# Patient Record
Sex: FEMALE | Race: BLACK OR AFRICAN AMERICAN | NOT HISPANIC OR LATINO | URBAN - METROPOLITAN AREA
[De-identification: names, ages, dates, MRNs, and addresses within clinical notes are randomized per-mention and may not be internally consistent; named-entity substitution may affect disease eponyms.]

---

## 2018-02-02 ENCOUNTER — EMERGENCY (EMERGENCY)
Facility: HOSPITAL | Age: 23
LOS: 1 days | Discharge: HOME | End: 2018-02-02
Admitting: EMERGENCY MEDICINE
Payer: MEDICAID

## 2018-02-02 VITALS
TEMPERATURE: 98 F | OXYGEN SATURATION: 100 % | RESPIRATION RATE: 16 BRPM | HEART RATE: 107 BPM | DIASTOLIC BLOOD PRESSURE: 90 MMHG | SYSTOLIC BLOOD PRESSURE: 156 MMHG

## 2018-02-02 DIAGNOSIS — F41.9 ANXIETY DISORDER, UNSPECIFIED: ICD-10-CM

## 2018-02-02 DIAGNOSIS — R00.2 PALPITATIONS: ICD-10-CM

## 2018-02-02 PROCEDURE — 99283 EMERGENCY DEPT VISIT LOW MDM: CPT

## 2018-02-02 NOTE — ED PROVIDER NOTE - OBJECTIVE STATEMENT
21 y/o F with PMH of Anxiety presents to ED c/o anxiety attack this afternoon that began while on her way to work. She began to feel anxious, restless, paresthesias throughout her body and racing palpitations. Her sx's have since resolved and she is now feeling better but was unable to make it to work today. States she would need an excuse note for work.    Denies fever, chills, headache, dizziness, LOC, CP, SOB, abdo pain, N/V/D, fall or injury

## 2018-02-02 NOTE — ED PROVIDER NOTE - MEDICAL DECISION MAKING DETAILS
Pt A&Ox3. NAD. Sitting comfortably. Asymptomatic at this time. Will D/C with instructions to F/U with Dr. Oviedo this week.

## 2022-09-22 ENCOUNTER — EMERGENCY (EMERGENCY)
Facility: HOSPITAL | Age: 27
LOS: 1 days | Discharge: HOME | End: 2022-09-22
Attending: EMERGENCY MEDICINE | Admitting: EMERGENCY MEDICINE

## 2022-09-22 VITALS
HEART RATE: 85 BPM | DIASTOLIC BLOOD PRESSURE: 60 MMHG | SYSTOLIC BLOOD PRESSURE: 140 MMHG | OXYGEN SATURATION: 98 % | RESPIRATION RATE: 18 BRPM | TEMPERATURE: 98 F

## 2022-09-22 LAB
ALBUMIN SERPL ELPH-MCNC: 4.5 G/DL — SIGNIFICANT CHANGE UP (ref 3.3–5)
ALP SERPL-CCNC: 78 U/L — SIGNIFICANT CHANGE UP (ref 40–120)
ALT FLD-CCNC: 29 U/L — SIGNIFICANT CHANGE UP (ref 4–33)
ANION GAP SERPL CALC-SCNC: 13 MMOL/L — SIGNIFICANT CHANGE UP (ref 7–14)
AST SERPL-CCNC: 24 U/L — SIGNIFICANT CHANGE UP (ref 4–32)
BASOPHILS # BLD AUTO: 0.06 K/UL — SIGNIFICANT CHANGE UP (ref 0–0.2)
BASOPHILS NFR BLD AUTO: 0.8 % — SIGNIFICANT CHANGE UP (ref 0–2)
BILIRUB SERPL-MCNC: <0.2 MG/DL — SIGNIFICANT CHANGE UP (ref 0.2–1.2)
BUN SERPL-MCNC: 10 MG/DL — SIGNIFICANT CHANGE UP (ref 7–23)
CALCIUM SERPL-MCNC: 9.3 MG/DL — SIGNIFICANT CHANGE UP (ref 8.4–10.5)
CHLORIDE SERPL-SCNC: 102 MMOL/L — SIGNIFICANT CHANGE UP (ref 98–107)
CO2 SERPL-SCNC: 24 MMOL/L — SIGNIFICANT CHANGE UP (ref 22–31)
CREAT SERPL-MCNC: 0.84 MG/DL — SIGNIFICANT CHANGE UP (ref 0.5–1.3)
EGFR: 98 ML/MIN/1.73M2 — SIGNIFICANT CHANGE UP
EOSINOPHIL # BLD AUTO: 0.62 K/UL — HIGH (ref 0–0.5)
EOSINOPHIL NFR BLD AUTO: 8 % — HIGH (ref 0–6)
FLUAV AG NPH QL: SIGNIFICANT CHANGE UP
FLUBV AG NPH QL: SIGNIFICANT CHANGE UP
GLUCOSE SERPL-MCNC: 106 MG/DL — HIGH (ref 70–99)
HCG SERPL-ACNC: <5 MIU/ML — SIGNIFICANT CHANGE UP
HCT VFR BLD CALC: 41.1 % — SIGNIFICANT CHANGE UP (ref 34.5–45)
HGB BLD-MCNC: 13.2 G/DL — SIGNIFICANT CHANGE UP (ref 11.5–15.5)
IANC: 3.66 K/UL — SIGNIFICANT CHANGE UP (ref 1.8–7.4)
IMM GRANULOCYTES NFR BLD AUTO: 0.4 % — SIGNIFICANT CHANGE UP (ref 0–0.9)
LYMPHOCYTES # BLD AUTO: 2.69 K/UL — SIGNIFICANT CHANGE UP (ref 1–3.3)
LYMPHOCYTES # BLD AUTO: 34.8 % — SIGNIFICANT CHANGE UP (ref 13–44)
MCHC RBC-ENTMCNC: 30 PG — SIGNIFICANT CHANGE UP (ref 27–34)
MCHC RBC-ENTMCNC: 32.1 GM/DL — SIGNIFICANT CHANGE UP (ref 32–36)
MCV RBC AUTO: 93.4 FL — SIGNIFICANT CHANGE UP (ref 80–100)
MONOCYTES # BLD AUTO: 0.67 K/UL — SIGNIFICANT CHANGE UP (ref 0–0.9)
MONOCYTES NFR BLD AUTO: 8.7 % — SIGNIFICANT CHANGE UP (ref 2–14)
NEUTROPHILS # BLD AUTO: 3.66 K/UL — SIGNIFICANT CHANGE UP (ref 1.8–7.4)
NEUTROPHILS NFR BLD AUTO: 47.3 % — SIGNIFICANT CHANGE UP (ref 43–77)
NRBC # BLD: 0 /100 WBCS — SIGNIFICANT CHANGE UP (ref 0–0)
NRBC # FLD: 0 K/UL — SIGNIFICANT CHANGE UP (ref 0–0)
PLATELET # BLD AUTO: 276 K/UL — SIGNIFICANT CHANGE UP (ref 150–400)
POTASSIUM SERPL-MCNC: 4.5 MMOL/L — SIGNIFICANT CHANGE UP (ref 3.5–5.3)
POTASSIUM SERPL-SCNC: 4.5 MMOL/L — SIGNIFICANT CHANGE UP (ref 3.5–5.3)
PROT SERPL-MCNC: 7.8 G/DL — SIGNIFICANT CHANGE UP (ref 6–8.3)
RBC # BLD: 4.4 M/UL — SIGNIFICANT CHANGE UP (ref 3.8–5.2)
RBC # FLD: 12.6 % — SIGNIFICANT CHANGE UP (ref 10.3–14.5)
RSV RNA NPH QL NAA+NON-PROBE: SIGNIFICANT CHANGE UP
SARS-COV-2 RNA SPEC QL NAA+PROBE: SIGNIFICANT CHANGE UP
SODIUM SERPL-SCNC: 139 MMOL/L — SIGNIFICANT CHANGE UP (ref 135–145)
WBC # BLD: 7.73 K/UL — SIGNIFICANT CHANGE UP (ref 3.8–10.5)
WBC # FLD AUTO: 7.73 K/UL — SIGNIFICANT CHANGE UP (ref 3.8–10.5)

## 2022-09-22 PROCEDURE — 70481 CT ORBIT/EAR/FOSSA W/DYE: CPT | Mod: 26,MA

## 2022-09-22 PROCEDURE — 99285 EMERGENCY DEPT VISIT HI MDM: CPT

## 2022-09-22 NOTE — ED PROVIDER NOTE - NSFOLLOWUPCLINICS_GEN_ALL_ED_FT
[facility] - ENT  Otolaryngology (ENT)  [address] [place] [address]  Phone: [phone]  Fax:

## 2022-09-22 NOTE — ED PROVIDER NOTE - PROGRESS NOTE DETAILS
ct not c/w malignant otitis externa or osteomyelitis  pt seen by ENT, wick placed, f/u with ENT in 1 week  recommending augmentin, as well as cipro dex - or oflox otic and dex otic drops seperately for insurance coverage  return precautions discussed for fevers, swelling around ear, worsening discharge or pain.

## 2022-09-22 NOTE — ED PROVIDER NOTE - NS ED ATTENDING STATEMENT MOD
This was a shared visit with the CHRISTIAN. I reviewed and verified the documentation and independently performed the documented:

## 2022-09-22 NOTE — ED PROVIDER NOTE - ATTENDING APP SHARED VISIT CONTRIBUTION OF CARE
DR. SQUIRES, ATTENDING MD-  I personally saw the patient with the PA and performed a substantive portion of the visit including all aspects of the medical decision making.    28 y/o female b/l ear pain.  Dx one month ago with otitis externa b/l and started on abx gtts.  Has had progressive worsening since that time.  Now with dec hearing L>R.  Occasional dc from b/l ears.  No f/c.  Occasional ha.  Eval for malignant otitis.  Obtain hcg cbc cmp ct iac, pt declines pain meds at this time.

## 2022-09-22 NOTE — ED PROVIDER NOTE - CLINICAL SUMMARY MEDICAL DECISION MAKING FREE TEXT BOX
27yoF PMH asthma, obesity, p/w b/l ear pain, L>R, no relief with otic abx drops (cortisporin), +edema noted in ear canal/ discharge, + pinna tug L ear, TM not visualized    concern for malignant otitis externa due to refractory to abx     will order labs, CT IAC  reassess

## 2022-09-22 NOTE — ED PROVIDER NOTE - OBJECTIVE STATEMENT
27yoF PMH obesity, asthma, seasonal allergies on singulair/claritin daily presenting to ED w/ b/l ear pain. pt states that early august she was diagnosed w/ otitis externa, tx with cortisporin otic with no relief and has been continuously using since early august. + yellow drainage from her ears, L>R, admits to muffled hearing L>R. +tinnitus of R ear. denies any dizziness, headaches, sore throat, cp, sob, eye pain, n/v/d/c. no hx of recurrent ear infections in past. was scheduled to see ENT today, however her appt was cancelled.

## 2022-09-22 NOTE — ED ADULT NURSE NOTE - OBJECTIVE STATEMENT
26 y/o female, a&ox4, received to intake rm 16. Pt c/o b/l ear pain for the past month, unrelieved with home use of antibiotics. Denies CP, SOB, or dyspnea at this time. Respirations are even and unlabored, no signs of respiratory distress. 20GIV placed to [facility], labs collected and sent off.

## 2022-09-22 NOTE — ED PROVIDER NOTE - PHYSICAL EXAMINATION
CONSTITUTIONAL: Well-appearing; well-nourished; in no apparent distress;  HEAD: Normocephalic, atraumatic;  EYES: PERRL, EOM intact, conjunctiva and sclera WNL;  ENT: normal nose; no rhinorrhea; unremarkable pharynx. no preauricular lymphadenopathy. + pinna tug to L ear, edema noted to b/l ear canal, yellowish appearing discharge noted to b/l ear canal. TM not visualized. no periauricular edema. no mastoid ttp.   NECK/LYMPH: Supple; non-tender;   CARD: Normal S1, S2; no murmurs, rubs, or gallops noted  RESP: Normal chest excursion with respiration; breath sounds clear and equal bilaterally; no wheezes, rhonchi, or rales noted  EXT/MS: moves all extremities; distal pulses are normal, no pedal edema  SKIN: Normal for age and race; warm; dry; good turgor; no apparent lesions or exudate noted  NEURO: Awake, alert, oriented x 3, no gross deficits, CN II-XII grossly intact, no motor or sensory deficit noted  PSYCH: Normal mood; appropriate affect

## 2022-09-22 NOTE — ED PROVIDER NOTE - NSFOLLOWUPINSTRUCTIONS_ED_ALL_ED_FT
start taking augmentin 875mg twice daily for 10 days.  start using ofloxacin as well as dexamethasone otic drops as prescribed.  Follow up with ENT clinic in 1 week for wick removal.  if you develop fevers, swelling around the ear, hearing loss, dizziness, vomiting, or worsening pain / discharge, return to our ED for evaluation.

## 2022-09-22 NOTE — ED PROVIDER NOTE - PATIENT PORTAL LINK FT
You can access the FollowMyHealth Patient Portal offered by [facility] by registering at the following website: [email or web address]. By joining [employer]’s FollowMyHealth portal, you will also be able to view your health information using other applications (apps) compatible with our system.

## 2022-09-23 VITALS
RESPIRATION RATE: 17 BRPM | SYSTOLIC BLOOD PRESSURE: 100 MMHG | OXYGEN SATURATION: 100 % | DIASTOLIC BLOOD PRESSURE: 54 MMHG | TEMPERATURE: 98 F | HEART RATE: 85 BPM

## 2022-09-23 RX ORDER — CIPROFLOXACIN AND DEXAMETHASONE 3; 1 MG/ML; MG/ML
4 SUSPENSION/ DROPS AURICULAR (OTIC)
Qty: 80 | Refills: 0
Start: 2022-09-23 | End: 2022-10-02

## 2022-09-23 RX ADMIN — Medication 1 TABLET(S): at 03:54

## 2022-09-23 NOTE — CONSULT NOTE ADULT - SUBJECTIVE AND OBJECTIVE BOX
HPI:  Patient is a 27y Female with1 month of bilateral ear pain. Has been using drops daily since this began, still with bilateral ear pain. Denies fevers. Tolerating diet. Uses q tips.       Physical Exam  T(C): 36.7 (09-23-22 @ 01:37), Max: 37 (09-22-22 @ 23:08)  HR: 85 (09-23-22 @ 01:37) (85 - 85)  BP: 100/54 (09-23-22 @ 01:37) (100/54 - 140/60)  RR: 17 (09-23-22 @ 01:37) (17 - 18)  SpO2: 100% (09-23-22 @ 01:37) (98% - 100%)    General: NAD, A+Ox3  No respiratory distress, stridor, or stertor  Voice quality: normal  Face:  Symmetric without masses or lesions  AD: Pinna wnl, EAC edematous/tender, unable to visualize tm  AS: Pinna wnl, EAC edematous/tender, unable to visualize tm  Nose: nasal cavity clear bilaterally, inferior turbinates normal, mucosa normal without crusting or bleeding  OC/OP: tongue normal, floor of mouth wnl, no masses or lesions, OP clear  Neck: soft/flat, no LAD    A/P:  27 year old female with bilateral otitis externa with edematous ear canals. Meme placed bilaterally and culture obtained.   - send home on ofloxacin otic drops in combination with dexamethasone otic drops, each one 3 drops twice a day for 1 week  - follow up cultures  - return to clinic in 4-5 days for ear wick removal  --------------------------------------------------------------  Thank you for the consult,    Toni Garcia MD  Resident  Department of Otolaryngology - Head and Neck Surgery  Spectra #[phone]  Peds Page #[phone]  Adult Page #[phone]  --------------------------------------------------------------- HPI:  Patient is a 27y Female with1 month of bilateral ear pain. Has been using drops daily since this began, still with bilateral ear pain. Denies fevers. Tolerating diet. Uses q tips.       Physical Exam  T(C): 36.7 (09-23-22 @ 01:37), Max: 37 (09-22-22 @ 23:08)  HR: 85 (09-23-22 @ 01:37) (85 - 85)  BP: 100/54 (09-23-22 @ 01:37) (100/54 - 140/60)  RR: 17 (09-23-22 @ 01:37) (17 - 18)  SpO2: 100% (09-23-22 @ 01:37) (98% - 100%)    General: NAD, A+Ox3  No respiratory distress, stridor, or stertor  Voice quality: normal  Face:  Symmetric without masses or lesions  AD: Pinna wnl, EAC edematous/tender, unable to visualize tm  AS: Pinna wnl, EAC edematous/tender, unable to visualize tm  Nose: nasal cavity clear bilaterally, inferior turbinates normal, mucosa normal without crusting or bleeding  OC/OP: tongue normal, floor of mouth wnl, no masses or lesions, OP clear  Neck: soft/flat, no LAD    A/P:  27 year old female with bilateral otitis externa with edematous ear canals. Meme placed bilaterally and culture obtained.   - send home on ofloxacin otic drops in combination with dexamethasone otic drops, each one 3 drops twice a day for 1 week  - follow up cultures  - augmentin for 7 days  - return to clinic in 4-5 days for ear wick removal  --------------------------------------------------------------  Thank you for the consult,    Toni Garcia MD  Resident  Department of Otolaryngology - Head and Neck Surgery  Spectra #[phone]  Peds Page #[phone]  Adult Page #[phone]  --------------------------------------------------------------- HPI:  Patient is a 27y Female with1 month of bilateral ear pain. Has been using drops daily since this began, still with bilateral ear pain. Denies fevers. Tolerating diet. Uses q tips.       Physical Exam  T(C): 36.7 (09-23-22 @ 01:37), Max: 37 (09-22-22 @ 23:08)  HR: 85 (09-23-22 @ 01:37) (85 - 85)  BP: 100/54 (09-23-22 @ 01:37) (100/54 - 140/60)  RR: 17 (09-23-22 @ 01:37) (17 - 18)  SpO2: 100% (09-23-22 @ 01:37) (98% - 100%)    General: NAD, A+Ox3  No respiratory distress, stridor, or stertor  Voice quality: normal  Face:  Symmetric without masses or lesions  AD: Pinna wnl, EAC edematous/tender, unable to visualize tm  AS: Pinna wnl, EAC edematous/tender, unable to visualize tm  Nose: nasal cavity clear bilaterally, inferior turbinates normal, mucosa normal without crusting or bleeding  OC/OP: tongue normal, floor of mouth wnl, no masses or lesions, OP clear  Neck: soft/flat, no LAD    A/P:  27 year old female with bilateral otitis externa with edematous ear canals. Meme placed bilaterally and culture obtained.   - send home on ofloxacin otic drops in combination with dexamethasone otic drops, each one 3 drops twice a day for 1 week  - follow up cultures  - augmentin for 7 days  - return to clinic in 4-5 days for ear wick removal  - should follow up in 6-8 months for parotid lymph node noted on CT scan  --------------------------------------------------------------  Thank you for the consult,    Toni Garcia MD  Resident  Department of Otolaryngology - Head and Neck Surgery  Spectra #[phone]  Peds Page #[phone]  Adult Page #[phone]  --------------------------------------------------------------- HPI:  Patient is a 27y Female with1 month of bilateral ear pain. Has been using drops daily since this began, still with bilateral ear pain. Denies fevers. Tolerating diet. Uses q tips. Denies dizziness, denies change in hearing.      Physical Exam  T(C): 36.7 (09-23-22 @ 01:37), Max: 37 (09-22-22 @ 23:08)  HR: 85 (09-23-22 @ 01:37) (85 - 85)  BP: 100/54 (09-23-22 @ 01:37) (100/54 - 140/60)  RR: 17 (09-23-22 @ 01:37) (17 - 18)  SpO2: 100% (09-23-22 @ 01:37) (98% - 100%)    General: NAD, A+Ox3  No respiratory distress, stridor, or stertor  Voice quality: normal  Face:  Symmetric without masses or lesions  AD: Pinna wnl, EAC edematous/tender, unable to visualize tm  AS: Pinna wnl, EAC edematous/tender, unable to visualize tm  Nose: nasal cavity clear bilaterally, inferior turbinates normal, mucosa normal without crusting or bleeding  OC/OP: tongue normal, floor of mouth wnl, no masses or lesions, OP clear  Neck: soft/flat, no LAD  CN V, VII intact    A/P:  27 year old female with bilateral otitis externa with edematous ear canals. Meme placed bilaterally and culture obtained.   - send home on ofloxacin otic drops in combination with dexamethasone otic drops, each one 3 drops twice a day for 1 week  - follow up cultures  - augmentin for 7 days  - return to clinic in 4-5 days for ear wick removal  - should follow up in 6-8 months for parotid lymph node noted on CT scan  --------------------------------------------------------------  Thank you for the consult,    Toni Garcia MD  Resident  Department of Otolaryngology - Head and Neck Surgery  Spectra #[phone]  Peds Page #[phone]  Adult Page #[phone]  ---------------------------------------------------------------

## 2022-09-24 RX ORDER — CIPROFLOXACIN AND DEXAMETHASONE 3; 1 MG/ML; MG/ML
4 SUSPENSION/ DROPS AURICULAR (OTIC)
Qty: 80 | Refills: 0
Start: 2022-09-24 | End: 2022-09-30

## 2022-09-24 NOTE — ED POST DISCHARGE NOTE - RESULT SUMMARY
Received call from patient who requests that Rx for augmentin and ciprodex be sent to new preferred pharmacy. Rx resent to pt's preferred pharmacy at this time.

## 2022-09-25 LAB
-  AMIKACIN: SIGNIFICANT CHANGE UP
-  AMOXICILLIN/CLAVULANIC ACID: SIGNIFICANT CHANGE UP
-  AMPICILLIN/SULBACTAM: SIGNIFICANT CHANGE UP
-  AMPICILLIN: SIGNIFICANT CHANGE UP
-  AZTREONAM: SIGNIFICANT CHANGE UP
-  CEFAZOLIN: SIGNIFICANT CHANGE UP
-  CEFEPIME: SIGNIFICANT CHANGE UP
-  CEFOXITIN: SIGNIFICANT CHANGE UP
-  CEFTAZIDIME: SIGNIFICANT CHANGE UP
-  CEFTRIAXONE: SIGNIFICANT CHANGE UP
-  CIPROFLOXACIN: SIGNIFICANT CHANGE UP
-  ERTAPENEM: SIGNIFICANT CHANGE UP
-  GENTAMICIN: SIGNIFICANT CHANGE UP
-  IMIPENEM: SIGNIFICANT CHANGE UP
-  LEVOFLOXACIN: SIGNIFICANT CHANGE UP
-  MEROPENEM: SIGNIFICANT CHANGE UP
-  PIPERACILLIN/TAZOBACTAM: SIGNIFICANT CHANGE UP
-  TOBRAMYCIN: SIGNIFICANT CHANGE UP
-  TRIMETHOPRIM/SULFAMETHOXAZOLE: SIGNIFICANT CHANGE UP
METHOD TYPE: SIGNIFICANT CHANGE UP

## 2022-09-28 LAB
CULTURE RESULTS: SIGNIFICANT CHANGE UP
ORGANISM # SPEC MICROSCOPIC CNT: SIGNIFICANT CHANGE UP
SPECIMEN SOURCE: SIGNIFICANT CHANGE UP

## 2023-12-01 PROBLEM — F41.9 ANXIETY DISORDER, UNSPECIFIED: Chronic | Status: ACTIVE | Noted: 2018-02-02
